# Patient Record
Sex: FEMALE | Race: OTHER | NOT HISPANIC OR LATINO | URBAN - METROPOLITAN AREA
[De-identification: names, ages, dates, MRNs, and addresses within clinical notes are randomized per-mention and may not be internally consistent; named-entity substitution may affect disease eponyms.]

---

## 2020-05-25 ENCOUNTER — OUTPATIENT (OUTPATIENT)
Dept: OUTPATIENT SERVICES | Facility: HOSPITAL | Age: 35
LOS: 1 days | Discharge: HOME | End: 2020-05-25

## 2020-05-25 DIAGNOSIS — Z11.59 ENCOUNTER FOR SCREENING FOR OTHER VIRAL DISEASES: ICD-10-CM

## 2020-05-26 LAB — SARS-COV-2 RNA SPEC QL NAA+PROBE: SIGNIFICANT CHANGE UP

## 2020-05-27 ENCOUNTER — INPATIENT (INPATIENT)
Facility: HOSPITAL | Age: 35
LOS: 1 days | Discharge: HOME | End: 2020-05-29
Attending: OBSTETRICS & GYNECOLOGY | Admitting: OBSTETRICS & GYNECOLOGY

## 2020-05-27 VITALS
DIASTOLIC BLOOD PRESSURE: 67 MMHG | TEMPERATURE: 98 F | HEIGHT: 61.42 IN | SYSTOLIC BLOOD PRESSURE: 126 MMHG | RESPIRATION RATE: 16 BRPM | WEIGHT: 210.1 LBS | HEART RATE: 85 BPM

## 2020-05-27 DIAGNOSIS — Z98.891 HISTORY OF UTERINE SCAR FROM PREVIOUS SURGERY: Chronic | ICD-10-CM

## 2020-05-27 LAB
ABO RH CONFIRMATION: SIGNIFICANT CHANGE UP
AMPHET UR-MCNC: NEGATIVE — SIGNIFICANT CHANGE UP
APPEARANCE UR: ABNORMAL
BACTERIA # UR AUTO: ABNORMAL
BARBITURATES UR SCN-MCNC: NEGATIVE — SIGNIFICANT CHANGE UP
BASOPHILS # BLD AUTO: 0.04 K/UL — SIGNIFICANT CHANGE UP (ref 0–0.2)
BASOPHILS NFR BLD AUTO: 0.7 % — SIGNIFICANT CHANGE UP (ref 0–1)
BENZODIAZ UR-MCNC: NEGATIVE — SIGNIFICANT CHANGE UP
BILIRUB UR-MCNC: NEGATIVE — SIGNIFICANT CHANGE UP
BLD GP AB SCN SERPL QL: SIGNIFICANT CHANGE UP
BUPRENORPHINE SCREEN, URINE RESULT: NEGATIVE — SIGNIFICANT CHANGE UP
COCAINE METAB.OTHER UR-MCNC: NEGATIVE — SIGNIFICANT CHANGE UP
COLOR SPEC: YELLOW — SIGNIFICANT CHANGE UP
DIFF PNL FLD: NEGATIVE — SIGNIFICANT CHANGE UP
EOSINOPHIL # BLD AUTO: 0.29 K/UL — SIGNIFICANT CHANGE UP (ref 0–0.7)
EOSINOPHIL NFR BLD AUTO: 5.4 % — SIGNIFICANT CHANGE UP (ref 0–8)
EPI CELLS # UR: 7 /HPF — HIGH (ref 0–5)
FENTANYL UR QL: NEGATIVE — SIGNIFICANT CHANGE UP
GLUCOSE UR QL: NEGATIVE — SIGNIFICANT CHANGE UP
HCT VFR BLD CALC: 37.3 % — SIGNIFICANT CHANGE UP (ref 37–47)
HGB BLD-MCNC: 12.9 G/DL — SIGNIFICANT CHANGE UP (ref 12–16)
HIV 1+2 AB+HIV1 P24 AG SERPL QL IA: SIGNIFICANT CHANGE UP
HYALINE CASTS # UR AUTO: 10 /LPF — HIGH (ref 0–7)
IMM GRANULOCYTES NFR BLD AUTO: 0.7 % — HIGH (ref 0.1–0.3)
KETONES UR-MCNC: NEGATIVE — SIGNIFICANT CHANGE UP
L&D DRUG SCREEN, URINE: SIGNIFICANT CHANGE UP
LEUKOCYTE ESTERASE UR-ACNC: NEGATIVE — SIGNIFICANT CHANGE UP
LYMPHOCYTES # BLD AUTO: 1.48 K/UL — SIGNIFICANT CHANGE UP (ref 1.2–3.4)
LYMPHOCYTES # BLD AUTO: 27.5 % — SIGNIFICANT CHANGE UP (ref 20.5–51.1)
MCHC RBC-ENTMCNC: 33.2 PG — HIGH (ref 27–31)
MCHC RBC-ENTMCNC: 34.6 G/DL — SIGNIFICANT CHANGE UP (ref 32–37)
MCV RBC AUTO: 95.9 FL — SIGNIFICANT CHANGE UP (ref 81–99)
METHADONE UR-MCNC: NEGATIVE — SIGNIFICANT CHANGE UP
MONOCYTES # BLD AUTO: 0.52 K/UL — SIGNIFICANT CHANGE UP (ref 0.1–0.6)
MONOCYTES NFR BLD AUTO: 9.7 % — HIGH (ref 1.7–9.3)
NEUTROPHILS # BLD AUTO: 3.01 K/UL — SIGNIFICANT CHANGE UP (ref 1.4–6.5)
NEUTROPHILS NFR BLD AUTO: 56 % — SIGNIFICANT CHANGE UP (ref 42.2–75.2)
NITRITE UR-MCNC: NEGATIVE — SIGNIFICANT CHANGE UP
NRBC # BLD: 0 /100 WBCS — SIGNIFICANT CHANGE UP (ref 0–0)
OPIATES UR-MCNC: NEGATIVE — SIGNIFICANT CHANGE UP
OXYCODONE UR-MCNC: NEGATIVE — SIGNIFICANT CHANGE UP
PCP UR-MCNC: NEGATIVE — SIGNIFICANT CHANGE UP
PH UR: 6 — SIGNIFICANT CHANGE UP (ref 5–8)
PLATELET # BLD AUTO: 185 K/UL — SIGNIFICANT CHANGE UP (ref 130–400)
PRENATAL SYPHILIS TEST: SIGNIFICANT CHANGE UP
PROPOXYPHENE QUALITATIVE URINE RESULT: NEGATIVE — SIGNIFICANT CHANGE UP
PROT UR-MCNC: ABNORMAL
RBC # BLD: 3.89 M/UL — LOW (ref 4.2–5.4)
RBC # FLD: 13.8 % — SIGNIFICANT CHANGE UP (ref 11.5–14.5)
RBC CASTS # UR COMP ASSIST: 7 /HPF — HIGH (ref 0–4)
SP GR SPEC: 1.03 — HIGH (ref 1.01–1.02)
UROBILINOGEN FLD QL: SIGNIFICANT CHANGE UP
WBC # BLD: 5.38 K/UL — SIGNIFICANT CHANGE UP (ref 4.8–10.8)
WBC # FLD AUTO: 5.38 K/UL — SIGNIFICANT CHANGE UP (ref 4.8–10.8)
WBC UR QL: 3 /HPF — SIGNIFICANT CHANGE UP (ref 0–5)

## 2020-05-27 RX ORDER — ENOXAPARIN SODIUM 100 MG/ML
40 INJECTION SUBCUTANEOUS EVERY 24 HOURS
Refills: 0 | Status: DISCONTINUED | OUTPATIENT
Start: 2020-05-28 | End: 2020-05-29

## 2020-05-27 RX ORDER — CEFAZOLIN SODIUM 1 G
2000 VIAL (EA) INJECTION ONCE
Refills: 0 | Status: COMPLETED | OUTPATIENT
Start: 2020-05-27 | End: 2020-05-27

## 2020-05-27 RX ORDER — MORPHINE SULFATE 50 MG/1
3 CAPSULE, EXTENDED RELEASE ORAL ONCE
Refills: 0 | Status: DISCONTINUED | OUTPATIENT
Start: 2020-05-27 | End: 2020-05-29

## 2020-05-27 RX ORDER — SODIUM CHLORIDE 9 MG/ML
1000 INJECTION, SOLUTION INTRAVENOUS
Refills: 0 | Status: DISCONTINUED | OUTPATIENT
Start: 2020-05-27 | End: 2020-05-27

## 2020-05-27 RX ORDER — INFLUENZA VIRUS VACCINE 15; 15; 15; 15 UG/.5ML; UG/.5ML; UG/.5ML; UG/.5ML
0.5 SUSPENSION INTRAMUSCULAR ONCE
Refills: 0 | Status: COMPLETED | OUTPATIENT
Start: 2020-05-27 | End: 2020-05-27

## 2020-05-27 RX ORDER — OXYTOCIN 10 UNIT/ML
333.33 VIAL (ML) INJECTION
Qty: 20 | Refills: 0 | Status: DISCONTINUED | OUTPATIENT
Start: 2020-05-27 | End: 2020-05-29

## 2020-05-27 RX ORDER — SODIUM CHLORIDE 9 MG/ML
1000 INJECTION, SOLUTION INTRAVENOUS
Refills: 0 | Status: DISCONTINUED | OUTPATIENT
Start: 2020-05-27 | End: 2020-05-29

## 2020-05-27 RX ORDER — FAMOTIDINE 10 MG/ML
20 INJECTION INTRAVENOUS ONCE
Refills: 0 | Status: COMPLETED | OUTPATIENT
Start: 2020-05-27 | End: 2020-05-27

## 2020-05-27 RX ORDER — METOCLOPRAMIDE HCL 10 MG
10 TABLET ORAL ONCE
Refills: 0 | Status: COMPLETED | OUTPATIENT
Start: 2020-05-27 | End: 2020-05-27

## 2020-05-27 RX ORDER — LANOLIN
1 OINTMENT (GRAM) TOPICAL EVERY 6 HOURS
Refills: 0 | Status: DISCONTINUED | OUTPATIENT
Start: 2020-05-27 | End: 2020-05-29

## 2020-05-27 RX ORDER — OXYCODONE HYDROCHLORIDE 5 MG/1
5 TABLET ORAL
Refills: 0 | Status: DISCONTINUED | OUTPATIENT
Start: 2020-05-27 | End: 2020-05-29

## 2020-05-27 RX ORDER — MAGNESIUM HYDROXIDE 400 MG/1
30 TABLET, CHEWABLE ORAL
Refills: 0 | Status: DISCONTINUED | OUTPATIENT
Start: 2020-05-27 | End: 2020-05-29

## 2020-05-27 RX ORDER — DIPHENHYDRAMINE HCL 50 MG
25 CAPSULE ORAL EVERY 6 HOURS
Refills: 0 | Status: DISCONTINUED | OUTPATIENT
Start: 2020-05-27 | End: 2020-05-29

## 2020-05-27 RX ORDER — DEXAMETHASONE 0.5 MG/5ML
4 ELIXIR ORAL EVERY 6 HOURS
Refills: 0 | Status: DISCONTINUED | OUTPATIENT
Start: 2020-05-27 | End: 2020-05-29

## 2020-05-27 RX ORDER — ACETAMINOPHEN 500 MG
975 TABLET ORAL
Refills: 0 | Status: DISCONTINUED | OUTPATIENT
Start: 2020-05-27 | End: 2020-05-29

## 2020-05-27 RX ORDER — SIMETHICONE 80 MG/1
80 TABLET, CHEWABLE ORAL EVERY 4 HOURS
Refills: 0 | Status: DISCONTINUED | OUTPATIENT
Start: 2020-05-27 | End: 2020-05-29

## 2020-05-27 RX ORDER — ONDANSETRON 8 MG/1
4 TABLET, FILM COATED ORAL EVERY 6 HOURS
Refills: 0 | Status: DISCONTINUED | OUTPATIENT
Start: 2020-05-27 | End: 2020-05-29

## 2020-05-27 RX ORDER — CITRIC ACID/SODIUM CITRATE 300-500 MG
30 SOLUTION, ORAL ORAL ONCE
Refills: 0 | Status: COMPLETED | OUTPATIENT
Start: 2020-05-27 | End: 2020-05-27

## 2020-05-27 RX ORDER — NALOXONE HYDROCHLORIDE 4 MG/.1ML
0.1 SPRAY NASAL
Refills: 0 | Status: DISCONTINUED | OUTPATIENT
Start: 2020-05-27 | End: 2020-05-29

## 2020-05-27 RX ORDER — OXYCODONE HYDROCHLORIDE 5 MG/1
5 TABLET ORAL ONCE
Refills: 0 | Status: DISCONTINUED | OUTPATIENT
Start: 2020-05-27 | End: 2020-05-29

## 2020-05-27 RX ORDER — SODIUM CHLORIDE 9 MG/ML
1000 INJECTION, SOLUTION INTRAVENOUS ONCE
Refills: 0 | Status: DISCONTINUED | OUTPATIENT
Start: 2020-05-27 | End: 2020-05-27

## 2020-05-27 RX ORDER — KETOROLAC TROMETHAMINE 30 MG/ML
30 SYRINGE (ML) INJECTION EVERY 6 HOURS
Refills: 0 | Status: DISCONTINUED | OUTPATIENT
Start: 2020-05-27 | End: 2020-05-28

## 2020-05-27 RX ORDER — OXYCODONE HYDROCHLORIDE 5 MG/1
5 TABLET ORAL
Refills: 0 | Status: DISCONTINUED | OUTPATIENT
Start: 2020-05-27 | End: 2020-05-27

## 2020-05-27 RX ORDER — IBUPROFEN 200 MG
600 TABLET ORAL EVERY 6 HOURS
Refills: 0 | Status: COMPLETED | OUTPATIENT
Start: 2020-05-27 | End: 2021-04-25

## 2020-05-27 RX ORDER — OXYCODONE HYDROCHLORIDE 5 MG/1
10 TABLET ORAL
Refills: 0 | Status: DISCONTINUED | OUTPATIENT
Start: 2020-05-27 | End: 2020-05-27

## 2020-05-27 RX ADMIN — Medication 10 MILLIGRAM(S): at 10:08

## 2020-05-27 RX ADMIN — Medication 100 MILLIGRAM(S): at 10:09

## 2020-05-27 RX ADMIN — Medication 975 MILLIGRAM(S): at 22:22

## 2020-05-27 RX ADMIN — SIMETHICONE 80 MILLIGRAM(S): 80 TABLET, CHEWABLE ORAL at 22:22

## 2020-05-27 RX ADMIN — Medication 30 MILLIGRAM(S): at 19:54

## 2020-05-27 RX ADMIN — SIMETHICONE 80 MILLIGRAM(S): 80 TABLET, CHEWABLE ORAL at 19:54

## 2020-05-27 RX ADMIN — Medication 30 MILLILITER(S): at 10:07

## 2020-05-27 RX ADMIN — FAMOTIDINE 20 MILLIGRAM(S): 10 INJECTION INTRAVENOUS at 10:08

## 2020-05-27 NOTE — BRIEF OPERATIVE NOTE - OPERATION/FINDINGS
adhesions of omentum to uterus, uterus not exteriorized. low transverse uterine incision made.   viable female infant, delivered in vertex presentation, apgars 9/9, weight 8-10.

## 2020-05-27 NOTE — OB PROVIDER H&P - HISTORY OF PRESENT ILLNESS
34y  @ 39 weeks by LMP, EDC 6/3/2020 present for scheduled repeat C/S. Patient has h/o prior C/S x _. Denies VB, LOF, and ctx. +FM. No complications during this pregnancy. Last seen in office _ by  _. 34y  @ 39 weeks by LMP, EDC 6/3/2020 present for scheduled repeat C/S. Patient has h/o prior C/S x 3. Denies VB, LOF, and ctx. +FM. No complications during this pregnancy. 34y  @ 39 weeks by LMP, EDC 6/3/2020 present for scheduled repeat C/S. Patient has h/o prior C/S x 3. Patient states she began saeed earlier this week. Denies VB and LOF. +FM. No complications during this pregnancy.     Starting H.9

## 2020-05-27 NOTE — OB PROVIDER H&P - PRO PRENATAL LABS ORI SOURCE HBSAG
Called and  requested test results: Patient  Test results being requested: xray  When was the test done: 1/20/2020  Call back number: [phone]  Okay to leave detailed voice message: yes     Patient had test done at [facility] and has not received results.        hard copy, drawn during this pregnancy

## 2020-05-27 NOTE — PROGRESS NOTE ADULT - SUBJECTIVE AND OBJECTIVE BOX
PGY 1 Note:    Pt doing well, pain well controlled. No acute complaints. Denies fever, chills, N/V, CP, SOB, severe abdominal pain or heavy vaginal bleeding, dysuria.    Ambulating: No  Talley catheter in place - UO 150cc ([id number])  Flatus: No  Bowel movements: No  Breast or bottle feeding: Bottle  Diet: Regular    PAST MEDICAL & SURGICAL HISTORY:  No pertinent past medical history  History of  section: x 3      Physical Exam  Vital Signs Last 24 Hrs  T(F): 98 (27 May 2020 12:45), Max: 98.3 (27 May 2020 09:04)  HR: 80 (27 May 2020 15:22) (66 - 150)  BP: 115/57 (27 May 2020 15:16) (108/64 - 230/93)  RR: 16 (27 May 2020 09:04) (16 - 16)      Gen: AAOx3, NAD  Heart: S1S2, RRR  Lungs: CTABL  Abd: Soft, nontender, mildly distended, BS+  Incision: Dressing in place, no saturation.  Fundus: Firm, nontender, below the umbilicus  Lochia: Normal  Ext: No calf tenderness, no swelling, SCDs in place    Labs:                        12.9   5.38  )-----------( 185      ( 27 May 2020 09:26 )             37.3       MEDICATIONS  (STANDING):  acetaminophen   Tablet .. 975 milliGRAM(s) Oral <User Schedule>  ibuprofen  Tablet. 600 milliGRAM(s) Oral every 6 hours  influenza   Vaccine 0.5 milliLiter(s) IntraMuscular once  ketorolac   Injectable 30 milliGRAM(s) IV Push every 6 hours  lactated ringers. 1000 milliLiter(s) (125 mL/Hr) IV Continuous <Continuous>  morphine PF Epidural 3 milliGRAM(s) Epidural once  oxytocin Infusion 333.333 milliUNIT(s)/Min (1000 mL/Hr) IV Continuous <Continuous>  oxytocin Infusion 333.333 milliUNIT(s)/Min (1000 mL/Hr) IV Continuous <Continuous>  simethicone 80 milliGRAM(s) Chew every 4 hours

## 2020-05-27 NOTE — OB PROVIDER H&P - NSHPLABSRESULTS_GEN_ALL_CORE
GCT: 118 GCT: 118    11/22/19: 12.2 weeks: NT: WNL  1/17/2020: 20.2 weeks:  g, breech, post placenta, 3 VC,   2/7/2020: 23.2 weeks:  g, breech, post placenta, 3 VC, normal growth

## 2020-05-27 NOTE — OB PROVIDER H&P - NS_OBGYNHISTORY_OBGYN_ALL_OB_FT
OB Hx: C/S x 3. Largest 3.75                     Year? GA? /CS? Sex? Weight? Hospital? Complications? Epidural?  G1 4/15/12 FT C/S F 3.5 kg [place]  G2 1/20/15 FT Rpt C/S F 3.75 kg [place]  G3 17 FT Rpt C/S M 3.5 kg [facility]    Gyn Hx:  Denies cysts, fibroids, abnormal paps, and STIs. OB Hx: C/S x 3. Largest 3.75               G1 4/15/12 FT C/S F 3.5 kg [place]  G2 1/20/15 FT Rpt C/S F 3.75 kg [place]  G3 8/8/17 FT Rpt C/S M 3.5 kg [facility]    Gyn Hx:  Denies cysts, fibroids, abnormal paps, and STIs.

## 2020-05-27 NOTE — BRIEF OPERATIVE NOTE - NSICDXBRIEFPREOP_GEN_ALL_CORE_FT
PRE-OP DIAGNOSIS:  39 weeks gestation of pregnancy 27-May-2020 12:38:02  Osvaldo Schmidt  Previous  section 27-May-2020 12:38:34  Osvaldo Schmidt

## 2020-05-27 NOTE — BRIEF OPERATIVE NOTE - NSICDXBRIEFPOSTOP_GEN_ALL_CORE_FT
POST-OP DIAGNOSIS:  Previous  section 27-May-2020 12:38:52  Osvaldo Schmidt  39 weeks gestation of pregnancy 27-May-2020 12:38:12  Osvaldo Schmidt

## 2020-05-27 NOTE — OB PROVIDER H&P - ASSESSMENT
34y  @ 39 weeks, h/o prior c/s x 3, scheduled rpt c/s x 4.    Plan:  Admit to L&D  IVF  NPO diet  Continuous TOCO/EFM  Ancef prior to OR  Abdominal prep  SCDs B/L  Pain Management PRN    Dr. galindo 34y  @ 39 weeks, h/o prior c/s x 3, scheduled rpt c/s x 4.    Plan:  Admit to L&D  IVF, labs (F/u HIV)  Cross 2 units  NPO diet  Continuous TOCO/EFM  Ancef prior to OR  Abdominal prep  SCDs B/L  Pain Management PRN    Dr. Adair aware

## 2020-05-27 NOTE — PROGRESS NOTE ADULT - ASSESSMENT
34y  s/p repeat LTCS @ 39 weeks, h/o prior c/s x 3, COVID neg, doing well    -pain management PRN  -SCDs on, lovenox ordered  -spirometry at the bedside  -monitor vitals  -f/u AM CBC, HIV  -encourage PO hydration  -regular diet  -simethicone ordered standing  -remove chan in AM    Dr. Frederick aware, Dr. Evangelista to be made aware

## 2020-05-27 NOTE — OB PROVIDER H&P - NSHPPHYSICALEXAM_GEN_ALL_CORE
HR: 85 (05-27-20 @ 09:04) (85 - 85)  BP: 126/64 (05-27-20 @ 09:04) (126/64 - 126/64)    EFM: bpm, moderate variability, +accels  TOCO: q mins HR: 85 (05-27-20 @ 09:04) (85 - 85)  BP: 126/64 (05-27-20 @ 09:04) (126/64 - 126/64)    EFM: 140 bpm, moderate variability, +accels  TOCO: q 7 mins HR: 85 (05-27-20 @ 09:04) (85 - 85)  BP: 126/64 (05-27-20 @ 09:04) (126/64 - 126/64)    EFM: 140 bpm, moderate variability, +accels  TOCO: q 7 mins    Abd: mild incisional tenderness. Soft, gravid.

## 2020-05-27 NOTE — CHART NOTE - NSCHARTNOTEFT_GEN_A_CORE
PACU ANESTHESIA ADMISSION NOTE      Procedure:   Post op diagnosis:      ____  Intubated  TV:______       Rate: ______      FiO2: ______    _x___  Patent Airway    ____  Full return of protective reflexes    ____  Full recovery from anesthesia / back to baseline     Vitals:   T:  98F         R:  20                BP:  129/59                Sat:  99                 P: 85      Mental Status:  _x___ Awake   _____ Alert   _____ Drowsy   _____ Sedated    Nausea/Vomiting:  __x__ NO  ______Yes,   See Post - Op Orders          Pain Scale (0-10):  ___0__    Treatment: ____ None    ____ See Post - Op/PCA Orders    Post - Operative Fluids:   ____ Oral   x____ See Post - Op Orders    Plan: Discharge:   ____Home       __x___Floor     _____Critical Care    _____  Other:_________________    Comments: Patient awake, VS stable, no anesthesia complications.

## 2020-05-28 LAB
BASOPHILS # BLD AUTO: 0.05 K/UL — SIGNIFICANT CHANGE UP (ref 0–0.2)
BASOPHILS NFR BLD AUTO: 0.7 % — SIGNIFICANT CHANGE UP (ref 0–1)
EOSINOPHIL # BLD AUTO: 0.25 K/UL — SIGNIFICANT CHANGE UP (ref 0–0.7)
EOSINOPHIL NFR BLD AUTO: 3.3 % — SIGNIFICANT CHANGE UP (ref 0–8)
HCT VFR BLD CALC: 34.8 % — LOW (ref 37–47)
HGB BLD-MCNC: 11.6 G/DL — LOW (ref 12–16)
IMM GRANULOCYTES NFR BLD AUTO: 0.4 % — HIGH (ref 0.1–0.3)
LYMPHOCYTES # BLD AUTO: 1.14 K/UL — LOW (ref 1.2–3.4)
LYMPHOCYTES # BLD AUTO: 14.9 % — LOW (ref 20.5–51.1)
MCHC RBC-ENTMCNC: 32.7 PG — HIGH (ref 27–31)
MCHC RBC-ENTMCNC: 33.3 G/DL — SIGNIFICANT CHANGE UP (ref 32–37)
MCV RBC AUTO: 98 FL — SIGNIFICANT CHANGE UP (ref 81–99)
MONOCYTES # BLD AUTO: 0.69 K/UL — HIGH (ref 0.1–0.6)
MONOCYTES NFR BLD AUTO: 9 % — SIGNIFICANT CHANGE UP (ref 1.7–9.3)
NEUTROPHILS # BLD AUTO: 5.51 K/UL — SIGNIFICANT CHANGE UP (ref 1.4–6.5)
NEUTROPHILS NFR BLD AUTO: 71.7 % — SIGNIFICANT CHANGE UP (ref 42.2–75.2)
NRBC # BLD: 0 /100 WBCS — SIGNIFICANT CHANGE UP (ref 0–0)
PLATELET # BLD AUTO: 161 K/UL — SIGNIFICANT CHANGE UP (ref 130–400)
RBC # BLD: 3.55 M/UL — LOW (ref 4.2–5.4)
RBC # FLD: 13.8 % — SIGNIFICANT CHANGE UP (ref 11.5–14.5)
WBC # BLD: 7.67 K/UL — SIGNIFICANT CHANGE UP (ref 4.8–10.8)
WBC # FLD AUTO: 7.67 K/UL — SIGNIFICANT CHANGE UP (ref 4.8–10.8)

## 2020-05-28 RX ORDER — IBUPROFEN 200 MG
600 TABLET ORAL EVERY 6 HOURS
Refills: 0 | Status: DISCONTINUED | OUTPATIENT
Start: 2020-05-28 | End: 2020-05-29

## 2020-05-28 RX ADMIN — Medication 30 MILLIGRAM(S): at 11:17

## 2020-05-28 RX ADMIN — SIMETHICONE 80 MILLIGRAM(S): 80 TABLET, CHEWABLE ORAL at 06:50

## 2020-05-28 RX ADMIN — Medication 975 MILLIGRAM(S): at 14:26

## 2020-05-28 RX ADMIN — SIMETHICONE 80 MILLIGRAM(S): 80 TABLET, CHEWABLE ORAL at 04:03

## 2020-05-28 RX ADMIN — Medication 30 MILLIGRAM(S): at 06:39

## 2020-05-28 RX ADMIN — Medication 975 MILLIGRAM(S): at 21:39

## 2020-05-28 RX ADMIN — SIMETHICONE 80 MILLIGRAM(S): 80 TABLET, CHEWABLE ORAL at 21:41

## 2020-05-28 RX ADMIN — SIMETHICONE 80 MILLIGRAM(S): 80 TABLET, CHEWABLE ORAL at 17:31

## 2020-05-28 RX ADMIN — Medication 600 MILLIGRAM(S): at 17:31

## 2020-05-28 NOTE — PROGRESS NOTE ADULT - ASSESSMENT
s/p repeat c/s #4, POD 1 recovering well  chan out, TOV by 1230  ERAS for pain  ambulation  regular diet  anticipate dc home tomorrow  VTE ppx    Dr. Salvador aware

## 2020-05-28 NOTE — PROGRESS NOTE ADULT - SUBJECTIVE AND OBJECTIVE BOX
PGY 4 note    Subjective: no complaints    Breastfeeding: yes  Flatus: yes  BM: no  chan in    Physical exam:    Vital Signs Last 24 Hrs  T(C): 36.4 (28 May 2020 04:58), Max: 36.9 (27 May 2020 19:30)  T(F): 97.6 (28 May 2020 04:58), Max: 98.5 (27 May 2020 19:30)  HR: 92 (28 May 2020 04:58) (66 - 150)  BP: 119/58 (28 May 2020 04:58) (107/64 - 230/93)  BP(mean): --  RR: 18 (28 May 2020 04:58) (16 - 18)  SpO2: 98% (27 May 2020 15:22) (96% - 100%)    Gen: NAD  CVS: RRR  Lungs: CTAB  Abdomen: Soft, nontender, no distension , firm uterine fundus at umbilicus, dressing changed, incision c/d/i  Pelvic: Normal lochia noted  Ext: No calf tenderness  UO: 50 cc/ hour    Diet: regular    meds: (acetaminophen   Tablet ..   975 milliGRAM(s) Oral (05-27-20 @ 22:22)    ceFAZolin   IVPB   100 mL/Hr IV Intermittent (05-27-20 @ 10:09)    citric acid/sodium citrate Solution   30 milliLiter(s) Oral (05-27-20 @ 10:07)    famotidine Injectable   20 milliGRAM(s) IV Push (05-27-20 @ 10:08)    ketorolac   Injectable   30 milliGRAM(s) IV Push (05-28-20 @ 06:39)   30 milliGRAM(s) IV Push (05-27-20 @ 19:54)    metoclopramide Injectable   10 milliGRAM(s) IV Push (05-27-20 @ 10:08)    simethicone   80 milliGRAM(s) Chew (05-28-20 @ 06:50)   80 milliGRAM(s) Chew (05-28-20 @ 04:03)   80 milliGRAM(s) Chew (05-27-20 @ 22:22)   80 milliGRAM(s) Chew (05-27-20 @ 19:54)        LABS:                        11.6   7.67  )-----------( 161      ( 28 May 2020 05:49 )             34.8                         12.9   5.38  )-----------( 185      ( 27 May 2020 09:26 )             37.3     Antibody Screen: NEG (05-27 @ 09:25)

## 2020-05-29 VITALS
TEMPERATURE: 98 F | HEART RATE: 93 BPM | RESPIRATION RATE: 18 BRPM | SYSTOLIC BLOOD PRESSURE: 119 MMHG | DIASTOLIC BLOOD PRESSURE: 58 MMHG

## 2020-05-29 RX ORDER — OXYCODONE HYDROCHLORIDE 5 MG/1
1 TABLET ORAL
Qty: 8 | Refills: 0
Start: 2020-05-29 | End: 2020-05-30

## 2020-05-29 RX ORDER — ACETAMINOPHEN 500 MG
3 TABLET ORAL
Qty: 84 | Refills: 0
Start: 2020-05-29 | End: 2020-06-04

## 2020-05-29 RX ORDER — IBUPROFEN 200 MG
1 TABLET ORAL
Qty: 10 | Refills: 0
Start: 2020-05-29

## 2020-05-29 RX ORDER — IBUPROFEN 200 MG
1 TABLET ORAL
Qty: 28 | Refills: 0
Start: 2020-05-29 | End: 2020-06-04

## 2020-05-29 RX ORDER — OXYCODONE HYDROCHLORIDE 5 MG/1
1 TABLET ORAL
Qty: 12 | Refills: 0
Start: 2020-05-29 | End: 2020-05-30

## 2020-05-29 RX ADMIN — SIMETHICONE 80 MILLIGRAM(S): 80 TABLET, CHEWABLE ORAL at 05:26

## 2020-05-29 RX ADMIN — ENOXAPARIN SODIUM 40 MILLIGRAM(S): 100 INJECTION SUBCUTANEOUS at 00:56

## 2020-05-29 RX ADMIN — Medication 600 MILLIGRAM(S): at 05:26

## 2020-05-29 RX ADMIN — Medication 600 MILLIGRAM(S): at 00:58

## 2020-05-29 RX ADMIN — Medication 975 MILLIGRAM(S): at 10:36

## 2020-05-29 RX ADMIN — SIMETHICONE 80 MILLIGRAM(S): 80 TABLET, CHEWABLE ORAL at 10:36

## 2020-05-29 NOTE — DISCHARGE NOTE OB - MEDICATION SUMMARY - MEDICATIONS TO TAKE
I will START or STAY ON the medications listed below when I get home from the hospital:    acetaminophen 325 mg oral tablet  -- 3 tab(s) by mouth every 6 hours, As Needed   -- Indication: For pain    ibuprofen 600 mg oral tablet  -- 1 tab(s) by mouth every 6 hours, As Needed   -- Indication: For pain    oxyCODONE 5 mg oral tablet  -- 1 tab(s) by mouth every 4 hours, As Needed -Moderate to Severe Pain (4-10) MDD:6 tabs  -- Indication: For pain

## 2020-05-29 NOTE — DISCHARGE NOTE OB - CARE PROVIDER_API CALL
Medication: oxycodone  Sig: take 1 tab by mouth every 8 hrs as needed for pain   Qty: 65  Dosage: 5/325  Last Refill: 7/27/18  Pharmacy: [facility]  Patient last seen: 7/27/18  Next appointment to be seen: 10/26/18    Please call with any questions. Thank you             Grace Hernandez  Obstetrics and Gynecology  [address] [place] [address]  Phone: [phone]  Fax: [phone]  Established Patient  Follow Up Time: 1 week

## 2020-05-29 NOTE — DISCHARGE NOTE OB - PATIENT PORTAL LINK FT
You can access the FollowMyHealth Patient Portal offered by [facility] by registering at the following website: [email or web address]. By joining "[email or web address]"’s FollowMyHealth portal, you will also be able to view your health information using other applications (apps) compatible with our system.

## 2020-05-29 NOTE — PROGRESS NOTE ADULT - SUBJECTIVE AND OBJECTIVE BOX
PGY 4 note  [place]  ID [id number]    Subjective: no complaints    Breastfeeding: yes  Flatus: yes  BM: yes  Voiding:yes    Physical exam:    Vital Signs Last 24 Hrs  T(C): 36.1 (29 May 2020 03:20), Max: 36.6 (28 May 2020 08:00)  T(F): 97 (29 May 2020 03:20), Max: 97.8 (28 May 2020 08:00)  HR: 85 (29 May 2020 03:20) (56 - 103)  BP: 113/69 (29 May 2020 03:20) (107/64 - 119/60)  BP(mean): --  RR: 18 (29 May 2020 03:20) (18 - 24)  SpO2: 97% (28 May 2020 11:05) (97% - 97%)    Gen: NAD  CVS: RRR  Lungs: CTAB  Abdomen: Soft, nontender, no distension , firm uterine fundus at umbilicus, incision c/d/i, +BS  Pelvic: Normal lochia noted  Ext: No calf tenderness    Diet: regular    meds:   acetaminophen   Tablet ..   975 milliGRAM(s) Oral (05-28-20 @ 21:39)   975 milliGRAM(s) Oral (05-28-20 @ 14:26)    enoxaparin Injectable   40 milliGRAM(s) SubCutaneous (05-29-20 @ 00:56)    ibuprofen  Tablet.   600 milliGRAM(s) Oral (05-29-20 @ 05:26)   600 milliGRAM(s) Oral (05-29-20 @ 00:58)   600 milliGRAM(s) Oral (05-28-20 @ 17:31)    ketorolac   Injectable   30 milliGRAM(s) IV Push (05-28-20 @ 11:17)   30 milliGRAM(s) IV Push (05-28-20 @ 06:39)    simethicone   80 milliGRAM(s) Chew (05-29-20 @ 05:26)   80 milliGRAM(s) Chew (05-28-20 @ 21:41)   80 milliGRAM(s) Chew (05-28-20 @ 17:31)   80 milliGRAM(s) Chew (05-28-20 @ 06:50)        LABS:                        11.6   7.67  )-----------( 161      ( 28 May 2020 05:49 )             34.8                         12.9   5.38  )-----------( 185      ( 27 May 2020 09:26 )             37.3

## 2020-05-29 NOTE — PROGRESS NOTE ADULT - ASSESSMENT
s/p repeat c/s #4 POD 2 recovering well  dc home today  scripts sent  f/u in 1 week  precautions given    Dr. Salvador aware

## 2020-06-05 DIAGNOSIS — Z3A.39 39 WEEKS GESTATION OF PREGNANCY: ICD-10-CM

## 2020-06-05 DIAGNOSIS — O34.211 MATERNAL CARE FOR LOW TRANSVERSE SCAR FROM PREVIOUS CESAREAN DELIVERY: ICD-10-CM
